# Patient Record
Sex: MALE | Race: ASIAN | NOT HISPANIC OR LATINO
[De-identification: names, ages, dates, MRNs, and addresses within clinical notes are randomized per-mention and may not be internally consistent; named-entity substitution may affect disease eponyms.]

---

## 2018-04-20 ENCOUNTER — APPOINTMENT (OUTPATIENT)
Dept: CARDIOLOGY | Facility: CLINIC | Age: 68
End: 2018-04-20
Payer: MEDICARE

## 2018-04-20 ENCOUNTER — NON-APPOINTMENT (OUTPATIENT)
Age: 68
End: 2018-04-20

## 2018-04-20 VITALS
WEIGHT: 176 LBS | HEIGHT: 66.14 IN | BODY MASS INDEX: 28.28 KG/M2 | RESPIRATION RATE: 17 BRPM | DIASTOLIC BLOOD PRESSURE: 64 MMHG | HEART RATE: 84 BPM | SYSTOLIC BLOOD PRESSURE: 99 MMHG | TEMPERATURE: 97.6 F | OXYGEN SATURATION: 96 %

## 2018-04-20 PROCEDURE — 93000 ELECTROCARDIOGRAM COMPLETE: CPT

## 2018-04-20 PROCEDURE — 99204 OFFICE O/P NEW MOD 45 MIN: CPT

## 2018-05-22 ENCOUNTER — NON-APPOINTMENT (OUTPATIENT)
Age: 68
End: 2018-05-22

## 2018-05-22 ENCOUNTER — APPOINTMENT (OUTPATIENT)
Dept: CARDIOLOGY | Facility: CLINIC | Age: 68
End: 2018-05-22
Payer: MEDICARE

## 2018-05-22 VITALS
TEMPERATURE: 98.2 F | SYSTOLIC BLOOD PRESSURE: 147 MMHG | OXYGEN SATURATION: 99 % | DIASTOLIC BLOOD PRESSURE: 97 MMHG | WEIGHT: 181 LBS | BODY MASS INDEX: 29.09 KG/M2 | HEART RATE: 89 BPM | RESPIRATION RATE: 18 BRPM

## 2018-05-22 PROCEDURE — 99214 OFFICE O/P EST MOD 30 MIN: CPT

## 2018-05-22 RX ORDER — APIXABAN 5 MG/1
5 TABLET, FILM COATED ORAL
Refills: 0 | Status: ACTIVE | COMMUNITY

## 2018-05-22 RX ORDER — ATENOLOL 25 MG/1
25 TABLET ORAL
Refills: 0 | Status: ACTIVE | COMMUNITY

## 2018-07-20 ENCOUNTER — APPOINTMENT (OUTPATIENT)
Dept: CARDIOLOGY | Facility: CLINIC | Age: 68
End: 2018-07-20
Payer: MEDICARE

## 2018-07-20 VITALS
RESPIRATION RATE: 17 BRPM | TEMPERATURE: 98.1 F | OXYGEN SATURATION: 96 % | WEIGHT: 178 LBS | DIASTOLIC BLOOD PRESSURE: 81 MMHG | BODY MASS INDEX: 28.61 KG/M2 | SYSTOLIC BLOOD PRESSURE: 120 MMHG | HEART RATE: 84 BPM

## 2018-07-20 PROCEDURE — 99214 OFFICE O/P EST MOD 30 MIN: CPT

## 2018-10-17 ENCOUNTER — APPOINTMENT (OUTPATIENT)
Dept: CARDIOLOGY | Facility: CLINIC | Age: 68
End: 2018-10-17
Payer: MEDICARE

## 2018-10-17 VITALS
TEMPERATURE: 97.4 F | RESPIRATION RATE: 18 BRPM | SYSTOLIC BLOOD PRESSURE: 114 MMHG | WEIGHT: 176 LBS | HEART RATE: 65 BPM | DIASTOLIC BLOOD PRESSURE: 77 MMHG | BODY MASS INDEX: 28.29 KG/M2 | OXYGEN SATURATION: 97 %

## 2018-10-17 PROCEDURE — 99214 OFFICE O/P EST MOD 30 MIN: CPT

## 2019-01-15 NOTE — PHYSICAL EXAM
[General Appearance - Well Developed] : well developed [Normal Appearance] : normal appearance [Well Groomed] : well groomed [General Appearance - Well Nourished] : well nourished [No Deformities] : no deformities [General Appearance - In No Acute Distress] : no acute distress [Normal Conjunctiva] : the conjunctiva exhibited no abnormalities [Eyelids - No Xanthelasma] : the eyelids demonstrated no xanthelasmas [Normal Oral Mucosa] : normal oral mucosa [No Oral Pallor] : no oral pallor [No Oral Cyanosis] : no oral cyanosis [Normal Jugular Venous A Waves Present] : normal jugular venous A waves present [Normal Jugular Venous V Waves Present] : normal jugular venous V waves present [No Jugular Venous Barnhart A Waves] : no jugular venous barnhart A waves [Respiration, Rhythm And Depth] : normal respiratory rhythm and effort [Exaggerated Use Of Accessory Muscles For Inspiration] : no accessory muscle use [Auscultation Breath Sounds / Voice Sounds] : lungs were clear to auscultation bilaterally [Heart Sounds] : normal S1 and S2 [Murmurs] : no murmurs present [Arterial Pulses Normal] : the arterial pulses were normal [Edema] : no peripheral edema present [Irregularly Irregular] : the rhythm was irregularly irregular [Abdomen Soft] : soft [Abdomen Tenderness] : non-tender [Abdomen Mass (___ Cm)] : no abdominal mass palpated [Abnormal Walk] : normal gait [Gait - Sufficient For Exercise Testing] : the gait was sufficient for exercise testing [Nail Clubbing] : no clubbing of the fingernails [Cyanosis, Localized] : no localized cyanosis [Petechial Hemorrhages (___cm)] : no petechial hemorrhages [] : no ischemic changes [Oriented To Time, Place, And Person] : oriented to person, place, and time [Affect] : the affect was normal [Mood] : the mood was normal [No Anxiety] : not feeling anxious

## 2019-01-16 ENCOUNTER — NON-APPOINTMENT (OUTPATIENT)
Age: 69
End: 2019-01-16

## 2019-01-16 ENCOUNTER — APPOINTMENT (OUTPATIENT)
Dept: CARDIOLOGY | Facility: CLINIC | Age: 69
End: 2019-01-16
Payer: MEDICARE

## 2019-01-16 VITALS
BODY MASS INDEX: 28.77 KG/M2 | RESPIRATION RATE: 16 BRPM | WEIGHT: 179 LBS | HEART RATE: 70 BPM | DIASTOLIC BLOOD PRESSURE: 89 MMHG | SYSTOLIC BLOOD PRESSURE: 128 MMHG | OXYGEN SATURATION: 97 %

## 2019-01-16 PROCEDURE — 93306 TTE W/DOPPLER COMPLETE: CPT

## 2019-01-16 PROCEDURE — 99214 OFFICE O/P EST MOD 30 MIN: CPT

## 2019-01-16 PROCEDURE — 93000 ELECTROCARDIOGRAM COMPLETE: CPT

## 2019-07-17 ENCOUNTER — APPOINTMENT (OUTPATIENT)
Dept: CARDIOLOGY | Facility: CLINIC | Age: 69
End: 2019-07-17
Payer: MEDICARE

## 2019-07-17 VITALS
TEMPERATURE: 97.8 F | OXYGEN SATURATION: 98 % | DIASTOLIC BLOOD PRESSURE: 82 MMHG | SYSTOLIC BLOOD PRESSURE: 130 MMHG | RESPIRATION RATE: 17 BRPM | HEART RATE: 75 BPM | BODY MASS INDEX: 29.89 KG/M2 | WEIGHT: 186 LBS

## 2019-07-17 PROCEDURE — 99214 OFFICE O/P EST MOD 30 MIN: CPT

## 2019-08-16 ENCOUNTER — APPOINTMENT (OUTPATIENT)
Dept: UROLOGY | Facility: CLINIC | Age: 69
End: 2019-08-16
Payer: MEDICARE

## 2019-08-16 VITALS
BODY MASS INDEX: 29.57 KG/M2 | RESPIRATION RATE: 18 BRPM | TEMPERATURE: 97.3 F | HEART RATE: 59 BPM | WEIGHT: 184 LBS | OXYGEN SATURATION: 97 % | DIASTOLIC BLOOD PRESSURE: 86 MMHG | SYSTOLIC BLOOD PRESSURE: 137 MMHG

## 2019-08-16 DIAGNOSIS — R53.1 WEAKNESS: ICD-10-CM

## 2019-08-16 DIAGNOSIS — Z00.00 ENCOUNTER FOR GENERAL ADULT MEDICAL EXAMINATION W/OUT ABNORMAL FINDINGS: ICD-10-CM

## 2019-08-16 DIAGNOSIS — R35.0 FREQUENCY OF MICTURITION: ICD-10-CM

## 2019-08-16 PROCEDURE — 99204 OFFICE O/P NEW MOD 45 MIN: CPT

## 2019-08-16 NOTE — PHYSICAL EXAM
[General Appearance - Well Developed] : well developed [General Appearance - Well Nourished] : well nourished [Normal Appearance] : normal appearance [Well Groomed] : well groomed [General Appearance - In No Acute Distress] : no acute distress [Edema] : no peripheral edema [Respiration, Rhythm And Depth] : normal respiratory rhythm and effort [Exaggerated Use Of Accessory Muscles For Inspiration] : no accessory muscle use [Abdomen Soft] : soft [Abdomen Tenderness] : non-tender [Costovertebral Angle Tenderness] : no ~M costovertebral angle tenderness [Urethral Meatus] : meatus normal [Urinary Bladder Findings] : the bladder was normal on palpation [Testes Mass (___cm)] : there were no testicular masses [Scrotum] : the scrotum was normal [No Prostate Nodules] : no prostate nodules [Normal Station and Gait] : the gait and station were normal for the patient's age [] : no rash [No Focal Deficits] : no focal deficits [Oriented To Time, Place, And Person] : oriented to person, place, and time [Affect] : the affect was normal [Mood] : the mood was normal [Not Anxious] : not anxious [No Palpable Adenopathy] : no palpable adenopathy

## 2019-08-16 NOTE — LETTER BODY
[FreeTextEntry1] : Miguel A Nicolas MD\par [address]\[place] [address]\par [phone]\par \par Dear Dr. Grady,\par \par Reason for Visit: BPH.\par \par This is a 69 year-old gentleman from [place] with history of stroke resulting in slight right hemiparesis, presenting with symptoms of BPH. Patient is here today for evaluation, accompanied by his wife. His wife contributes to his history because the patient has a slight speech deficit following stroke. His stoke was one year ago while at the gym and the reason for his stroke is unknown. Patient reports frequency and hesitancy. He complains of nocturia 3-4x per night. However, he reports strong uroflow. He denies any hematuria or urinary incontinence. His symptoms are aggravated by hydration. He denies any alleviating factors. He has not tried any medical therapy previously. He reports no pain. All other review of systems are negative. He has no cancer in his family medical history. He has no previous surgical history. Past medical history, family history and social history were inquired and were noncontributory to current condition. The patient does not use tobacco or drink alcohol. Medications and allergies were reviewed. He has no known allergies to medication. \par \par On examination, the patient is a healthy-appearing gentleman in no acute distress. He is alert and oriented follows commands. He has normal mood and affect. He is normocephalic. Neck is supple. Oral no thrush. Respirations are unlabored. His abdomen is soft and nontender. Bladder is nonpalpable. No CVA tenderness. Neurologically he is grossly intact. No peripheral edema. Skin without gross abnormality. He has normal male external genitalia. Normal meatus. Bilateral testes are descended intrascrotally and normal to palpation. On rectal examination, there is normal sphincter tone. The prostate is clinically benign without focal induration or nodularity.\par \par His BMP demonstrated normal renal functions, creatinine 0.95. He was found to have high levels of potassium, 5.8.\par \par ASSESSMENT: BPH. Right-sided weakness.\par \par I counseled the patient on the various etiology of his symptoms. I discussed the natural history of BPH and the treatment options available. I discussed the options of conservative management with fluid in dietary restrictions, herbal therapy, medical therapy, and minimally invasive procedures. Risk and benefits were discussed. I answered his questions. I recommended he try Flomax. I discussed the potential side effects of the medication. I counseled the patient on its use and side effects. If the patient develops any side effects, the patient will discontinue the medication and contact me. In terms of his right-sided weakness, I recommend the patient practice exercises at home, such as using a stress ball, to gradually build his strength. In terms of his high potassium levels, I advise the patient to decrease his potassium intake. He will obtain BMP, PSA, and urinalysis today to ensure stability. The patient will follow-up as directed and will contact me with any questions or concerns. Thank you for the opportunity to participate in the care of Mr. AN. I will keep you updated on his progress.\par \par Plan: Trial of Flomax. BMP. PSA. Urinalysis. Follow up in 1 month.

## 2019-08-16 NOTE — ADDENDUM
[FreeTextEntry1] : Entered by Eddie Mcbride, acting as scribe for Dr. Reuben Umana.\par \par The documentation recorded by the scribe accurately reflects the service I personally performed and the decisions made by me.

## 2019-08-21 LAB
ANION GAP SERPL CALC-SCNC: 14 MMOL/L
APPEARANCE: CLEAR
BACTERIA: NEGATIVE
BILIRUBIN URINE: NEGATIVE
BLOOD URINE: NEGATIVE
BUN SERPL-MCNC: 13 MG/DL
CALCIUM SERPL-MCNC: 9.6 MG/DL
CHLORIDE SERPL-SCNC: 99 MMOL/L
CO2 SERPL-SCNC: 25 MMOL/L
COLOR: NORMAL
CREAT SERPL-MCNC: 0.89 MG/DL
GLUCOSE QUALITATIVE U: NEGATIVE
GLUCOSE SERPL-MCNC: 98 MG/DL
HYALINE CASTS: 0 /LPF
KETONES URINE: NEGATIVE
LEUKOCYTE ESTERASE URINE: NEGATIVE
MICROSCOPIC-UA: NORMAL
NITRITE URINE: NEGATIVE
PH URINE: 6.5
POTASSIUM SERPL-SCNC: 5.6 MMOL/L
PROTEIN URINE: NEGATIVE
PSA FREE FLD-MCNC: 55 %
PSA FREE SERPL-MCNC: 0.31 NG/ML
PSA SERPL-MCNC: 0.57 NG/ML
RED BLOOD CELLS URINE: 0 /HPF
SODIUM SERPL-SCNC: 138 MMOL/L
SPECIFIC GRAVITY URINE: 1.01
SQUAMOUS EPITHELIAL CELLS: 0 /HPF
UROBILINOGEN URINE: NORMAL
WHITE BLOOD CELLS URINE: 0 /HPF

## 2019-09-20 ENCOUNTER — APPOINTMENT (OUTPATIENT)
Dept: UROLOGY | Facility: CLINIC | Age: 69
End: 2019-09-20
Payer: MEDICARE

## 2019-09-20 VITALS
RESPIRATION RATE: 18 BRPM | SYSTOLIC BLOOD PRESSURE: 137 MMHG | OXYGEN SATURATION: 99 % | DIASTOLIC BLOOD PRESSURE: 77 MMHG | HEART RATE: 69 BPM | BODY MASS INDEX: 29.25 KG/M2 | WEIGHT: 182 LBS | TEMPERATURE: 98.2 F

## 2019-09-20 PROCEDURE — 99213 OFFICE O/P EST LOW 20 MIN: CPT

## 2019-09-20 NOTE — ADDENDUM
[FreeTextEntry1] : Entered by Ruddy Doss, acting as scribe for Dr. Reuben Umana.\par \par The documentation recorded by the scribe accurately reflects the service I personally performed and the decisions made by me.

## 2019-09-20 NOTE — LETTER BODY
[FreeTextEntry1] : Miguel A Nicolas MD\par [address]\[place] [address]\par [phone]\par \par Dear Dr. Grady,\par \par Reason for Visit: BPH.\par \par This is a 69 year-old gentleman from [place] with history of stroke resulting in slight right hemiparesis, presenting with symptoms of BPH. He previously had a stroke while at the gym which caused speech deficit. Patient is here today for follow-up. Since his last visit, he reports taking Flomax as directed with no side effects or difficulties. He reports little improvement in urinary symptoms and notes that he hydrates very frequently. He denies any hematuria or urinary incontinence. He reports no pain. All other review of systems are negative. He has no cancer in his family medical history. He has no previous surgical history. Past medical history, family history and social history were unchanged. Medications and allergies were reviewed. He has no known allergies to medication. \par \par On examination, the patient is a healthy-appearing gentleman in no acute distress. He is alert and oriented follows commands. He has normal mood and affect. He is normocephalic. Neck is supple. Oral no thrush. Respirations are unlabored. His abdomen is soft and nontender. Bladder is nonpalpable. No CVA tenderness. Neurologically he is grossly intact. No peripheral edema. Skin without gross abnormality. He has normal male external genitalia. Normal meatus. Bilateral testes are descended intrascrotally and normal to palpation. On rectal examination, there is normal sphincter tone. The prostate is clinically benign without focal induration or nodularity.\par \par His recent PSA was 0.57, which is within normal limits. His BMP demonstrated normal renal functions, creatinine 0.69. He was found to have high levels of potassium, 5.6.\par \par ASSESSMENT: BPH. Right-sided weakness.\par \par I counseled the patient on the various etiology of his symptoms. In terms of his BPH, I recommend he continue taking Flomax once a day. I advised him to hydrate less frequently in order to not aggravate his symptoms. The patient will follow-up as directed and will contact me with any questions or concerns. Thank you for the opportunity to participate in the care of Mr. AN. I will keep you updated on his progress.\par \par Plan: Continue Flomax. Follow up in 6 months.

## 2020-01-15 ENCOUNTER — APPOINTMENT (OUTPATIENT)
Dept: CARDIOLOGY | Facility: CLINIC | Age: 70
End: 2020-01-15
Payer: MEDICARE

## 2020-01-15 ENCOUNTER — NON-APPOINTMENT (OUTPATIENT)
Age: 70
End: 2020-01-15

## 2020-01-15 VITALS
DIASTOLIC BLOOD PRESSURE: 88 MMHG | BODY MASS INDEX: 29.41 KG/M2 | SYSTOLIC BLOOD PRESSURE: 131 MMHG | OXYGEN SATURATION: 96 % | HEART RATE: 74 BPM | RESPIRATION RATE: 18 BRPM | TEMPERATURE: 97.4 F | WEIGHT: 183 LBS

## 2020-01-15 PROCEDURE — 93000 ELECTROCARDIOGRAM COMPLETE: CPT

## 2020-01-15 PROCEDURE — 93306 TTE W/DOPPLER COMPLETE: CPT

## 2020-01-15 PROCEDURE — 99214 OFFICE O/P EST MOD 30 MIN: CPT

## 2020-07-16 NOTE — DISCUSSION/SUMMARY
[FreeTextEntry1] : 68-year-old male with AFIB, stroke, HTN presents for followup.  Patient was last seen on 10/17/18.  He is on Eliquis 5 mg BID for stroke prevention and  Atenolol 25 mg for rate control.  He is on Atorvastatin 40 mg for HLD.  He is on Losartan 50 mg for HTN.\par \par Patient reports feeling well. He is planning to return to  soon. His PCP recently cut his Losartan to 25 mg QD due to lowered BP.  Patient denies CP, SOB, palpitations, or lightheadedness. \par \par (1) AFIB, stroke -  Patient has been stable.  His HR is well-controlled.  He should continue Atenolol 25 mg.  His [id number] score is 4 (HTN, age 65-75, prior stroke).  He should continue Eliquis 5 mg BID for stroke prevention.  Patient underwent an echocardiogram and it showed normal LV function, moderate LAE (4.6 cm), without significant valvular pathology.  Patient was reassured. \par \par (2) HTN - His BP was good today.  He should continue Losartan 25 mg and Atenolol 25 mg.  \par \par (3) Followup - 6 months.

## 2020-07-16 NOTE — DISCUSSION/SUMMARY
[FreeTextEntry1] : 69-year-old male with AFIB, stroke, HTN presents for followup.  Patient was last seen on 1/16/19.   Patient underwent an echocardiogram and it showed normal LV function, moderate LAE (4.6 cm), without significant valvular pathology.   He is on Eliquis 5 mg BID for stroke prevention and  Atenolol 25 mg for rate control.  He is on Atorvastatin 20 mg for HLD.  He is on Amlodipine 2.5 mg for HTN.\par \par Patient reports feeling well.  He swims regularly.  Patient denies CP, SOB, palpitations, or lightheadedness.\par \par (1) AFIB, stroke -  Patient has been stable.  His HR is well-controlled.  He should continue Atenolol 25 mg.  His [id number] score is 4 (HTN, age 65-75, prior stroke).  He should continue Eliquis 5 mg BID for stroke prevention.  Patient underwent an echocardiogram on 1/16/19 and it showed normal LV function, moderate LAE (4.6 cm), without significant valvular pathology.  \par \par (2) HTN - His BP was good today.  He should continue Amlodipine 2.5 mg and Atenolol 25 mg.  \par \par (3) Followup - 6 months.

## 2020-07-16 NOTE — REASON FOR VISIT
[Follow-Up - Clinic] : a clinic follow-up of [Atrial Fibrillation] : atrial fibrillation [FreeTextEntry1] : stroke

## 2020-07-16 NOTE — HISTORY OF PRESENT ILLNESS
[FreeTextEntry1] : 69-year-old male with AFIB, stroke, HTN presents for followup.  Patient was last seen on 7/17/19.   He is on Eliquis 5 mg BID for stroke prevention and  Atenolol 25 mg for rate control.  He is on Atorvastatin 20 mg for HLD.  He is on Amlodipine 2.5 mg for HTN.\par \par Patient has been stable.  Patient denies CP, SOB, palpitations, or lightheadedness. He reports that he is able to run. Patient is requesting to change blood thinner due to high copay on Eliquis. I advised patient to check if Xarelto is cheaper.

## 2020-07-16 NOTE — DISCUSSION/SUMMARY
[FreeTextEntry1] : 69-year-old male with AFIB, stroke, HTN presents for followup.  Patient was last seen on 7/17/19.   He is on Eliquis 5 mg BID for stroke prevention and  Atenolol 25 mg for rate control.  He is on Atorvastatin 20 mg for HLD.  He is on Amlodipine 2.5 mg for HTN.\par \par Patient has been stable.  Patient denies CP, SOB, palpitations, or lightheadedness. He reports that he is able to run. Patient is requesting to change blood thinner due to high copay on Eliquis. I advised patient to check if Xarelto is cheaper. \par \par (1) AFIB, stroke -  Patient has been stable.  His HR is well-controlled.  He should continue Atenolol 25 mg.  His [id number] score is 4 (HTN, age 65-75, prior stroke).  He should continue Eliquis 5 mg BID for stroke prevention.  Patient underwent an echocardiogram  and it showed normal LV function, moderate LAE (4.7 cm), without significant valvular pathology.  \par \par (2) HTN - His BP was good today.  He should continue Amlodipine 2.5 mg and Atenolol 25 mg.  \par \par (3) Aortic aneurysm - Patient was noted to have moderate dilatation of the ascending aorta (4.6 cm).  I advised patient to keep BP under good control.  Patient should have a followup echo in 1 year. \par \par (4) Followup - 6 months.

## 2020-07-16 NOTE — HISTORY OF PRESENT ILLNESS
[FreeTextEntry1] : 68-year-old male with AFIB, stroke, HTN presents for followup.  Patient was last seen on 10/17/18.  He is on Eliquis 5 mg BID for stroke prevention and  Atenolol 25 mg for rate control.  He is on Atorvastatin 40 mg for HLD.  He is on Losartan 50 mg for HTN.\par \par Patient reports feeling well. He is planning to return to  soon. His PCP recently cut his Losartan to 25 mg QD due to lowered BP.  Patient denies CP, SOB, palpitations, or lightheadedness. \par

## 2020-07-16 NOTE — HISTORY OF PRESENT ILLNESS
[FreeTextEntry1] : 69-year-old male with AFIB, stroke, HTN presents for followup.  Patient was last seen on 1/16/19.   Patient underwent an echocardiogram and it showed normal LV function, moderate LAE (4.6 cm), without significant valvular pathology.   He is on Eliquis 5 mg BID for stroke prevention and  Atenolol 25 mg for rate control.  He is on Atorvastatin 20 mg for HLD.  He is on Amlodipine 2.5 mg for HTN.\par \par Patient reports feeling well.  He swims regularly.  Patient denies CP, SOB, palpitations, or lightheadedness.

## 2020-07-16 NOTE — PHYSICAL EXAM
[General Appearance - Well Developed] : well developed [Well Groomed] : well groomed [Normal Appearance] : normal appearance [General Appearance - In No Acute Distress] : no acute distress [General Appearance - Well Nourished] : well nourished [No Deformities] : no deformities [Normal Conjunctiva] : the conjunctiva exhibited no abnormalities [Eyelids - No Xanthelasma] : the eyelids demonstrated no xanthelasmas [Normal Oral Mucosa] : normal oral mucosa [No Oral Pallor] : no oral pallor [No Oral Cyanosis] : no oral cyanosis [Normal Jugular Venous A Waves Present] : normal jugular venous A waves present [Normal Jugular Venous V Waves Present] : normal jugular venous V waves present [No Jugular Venous Barnhart A Waves] : no jugular venous barnhart A waves [Respiration, Rhythm And Depth] : normal respiratory rhythm and effort [Exaggerated Use Of Accessory Muscles For Inspiration] : no accessory muscle use [Heart Sounds] : normal S1 and S2 [Auscultation Breath Sounds / Voice Sounds] : lungs were clear to auscultation bilaterally [Arterial Pulses Normal] : the arterial pulses were normal [Murmurs] : no murmurs present [Irregularly Irregular] : the rhythm was irregularly irregular [Edema] : no peripheral edema present [Abdomen Soft] : soft [Abdomen Tenderness] : non-tender [Abdomen Mass (___ Cm)] : no abdominal mass palpated [Abnormal Walk] : normal gait [Nail Clubbing] : no clubbing of the fingernails [Gait - Sufficient For Exercise Testing] : the gait was sufficient for exercise testing [Petechial Hemorrhages (___cm)] : no petechial hemorrhages [Cyanosis, Localized] : no localized cyanosis [] : no ischemic changes [Affect] : the affect was normal [Oriented To Time, Place, And Person] : oriented to person, place, and time [Mood] : the mood was normal [No Anxiety] : not feeling anxious

## 2020-07-17 ENCOUNTER — APPOINTMENT (OUTPATIENT)
Dept: CARDIOLOGY | Facility: CLINIC | Age: 70
End: 2020-07-17
Payer: MEDICARE

## 2020-07-17 VITALS
DIASTOLIC BLOOD PRESSURE: 83 MMHG | OXYGEN SATURATION: 96 % | WEIGHT: 164 LBS | HEART RATE: 82 BPM | SYSTOLIC BLOOD PRESSURE: 121 MMHG | RESPIRATION RATE: 18 BRPM | TEMPERATURE: 97.6 F | BODY MASS INDEX: 26.36 KG/M2

## 2020-07-17 DIAGNOSIS — I63.9 CEREBRAL INFARCTION, UNSPECIFIED: ICD-10-CM

## 2020-07-17 PROCEDURE — 99214 OFFICE O/P EST MOD 30 MIN: CPT

## 2020-07-17 RX ORDER — LOSARTAN POTASSIUM 25 MG/1
25 TABLET, FILM COATED ORAL DAILY
Qty: 30 | Refills: 5 | Status: DISCONTINUED | COMMUNITY
Start: 2018-01-05 | End: 2020-07-17

## 2020-07-31 PROBLEM — I63.9 STROKE: Status: ACTIVE | Noted: 2018-04-28

## 2020-08-06 NOTE — DISCUSSION/SUMMARY
[FreeTextEntry1] : 69-year-old male with AFIB, stroke, HTN presents for followup.  Patient was last seen on 7/17/19.   He is on Eliquis 5 mg BID for stroke prevention and  Atenolol 25 mg for rate control.  He is on Atorvastatin 20 mg for HLD.  He is on Amlodipine 2.5 mg for HTN.\par \par Patient has been stable.  Patient denies CP, SOB, palpitations, or lightheadedness. He reports that he is able to run. Patient is requesting to change blood thinner due to high copay on Eliquis. I advised patient to check if Xarelto is cheaper. \par \par (1) AFIB, stroke -  Patient has been stable.  His HR is well-controlled.  He should continue Atenolol 25 mg.  His [id number] score is 4 (HTN, age 65-75, prior stroke).  He should continue Eliquis 5 mg BID for stroke prevention.  Patient underwent an echocardiogram  and it showed normal LV function, moderate LAE (4.7 cm), without significant valvular pathology.  \par \par (2) HTN - His BP was good today.  He should continue Amlodipine 2.5 mg and Atenolol 25 mg.  \par \par (3) Aortic aneurysm - Patient was noted to have moderate dilatation of the ascending aorta (4.6 cm).  I advised patient to keep BP under good control.  Patient should have a followup echo in 1 year. \par \par (4) Followup - 6 months.

## 2020-08-06 NOTE — HISTORY OF PRESENT ILLNESS
[FreeTextEntry1] : 69-year-old male with AFIB, stroke, HTN presents for followup.  Patient was last seen on 1/15/20.   Patient underwent an echocardiogram  and it showed normal LV function, moderate LAE (4.7 cm), without significant valvular pathology.  Patient was noted to have moderate dilatation of the ascending aorta (4.6 cm).  He is on Eliquis 5 mg BID for stroke prevention and  Atenolol 25 mg for rate control.  He is on Atorvastatin 20 mg for HLD.  He is on Amlodipine 2.5 mg for HTN.\par \par ------------------------------------------------------------------------------------------------------------- \par previous visit summary: \par \par (1) AFIB, stroke -  Patient has been stable.  His HR is well-controlled.  He should continue Atenolol 25 mg.  His [id number] score is 4 (HTN, age 65-75, prior stroke).  He should continue Eliquis 5 mg BID for stroke prevention.  Patient underwent an echocardiogram  and it showed normal LV function, moderate LAE (4.7 cm), without significant valvular pathology.  \par \par (2) HTN - His BP was good today.  He should continue Amlodipine 2.5 mg and Atenolol 25 mg.  \par \par (3) Aortic aneurysm - Patient was noted to have moderate dilatation of the ascending aorta (4.6 cm).  I advised patient to keep BP under good control.  Patient should have a followup echo in 1 year. \par \par (4) Followup - 6 months.\par \par OLD NOTE -\par \par 7/17/19.   He is on Eliquis 5 mg BID for stroke prevention and  Atenolol 25 mg for rate control.  He is on Atorvastatin 20 mg for HLD.  He is on Amlodipine 2.5 mg for HTN.\par \par Patient has been stable.  Patient denies CP, SOB, palpitations, or lightheadedness. He reports that he is able to run. Patient is requesting to change blood thinner due to high copay on Eliquis. I advised patient to check if Xarelto is cheaper.

## 2020-08-06 NOTE — REASON FOR VISIT
[Follow-Up - Clinic] : a clinic follow-up of [Atrial Fibrillation] : atrial fibrillation [FreeTextEntry1] : Patient reports feeling okay. Patient denies CP, SOB, palpitations, or lightheadedness. He is on Eliquis, his BP is low but that is good for AA. He walks and his appetite is okay.

## 2021-01-15 ENCOUNTER — APPOINTMENT (OUTPATIENT)
Dept: CARDIOLOGY | Facility: CLINIC | Age: 71
End: 2021-01-15
Payer: MEDICARE

## 2021-01-15 ENCOUNTER — NON-APPOINTMENT (OUTPATIENT)
Age: 71
End: 2021-01-15

## 2021-01-15 VITALS
RESPIRATION RATE: 18 BRPM | DIASTOLIC BLOOD PRESSURE: 74 MMHG | WEIGHT: 166 LBS | HEART RATE: 65 BPM | OXYGEN SATURATION: 100 % | SYSTOLIC BLOOD PRESSURE: 124 MMHG | TEMPERATURE: 97.2 F | BODY MASS INDEX: 26.68 KG/M2

## 2021-01-15 PROCEDURE — 93306 TTE W/DOPPLER COMPLETE: CPT

## 2021-01-15 PROCEDURE — 99214 OFFICE O/P EST MOD 30 MIN: CPT

## 2021-01-15 PROCEDURE — 99072 ADDL SUPL MATRL&STAF TM PHE: CPT

## 2021-01-15 PROCEDURE — 93000 ELECTROCARDIOGRAM COMPLETE: CPT

## 2021-01-15 NOTE — PHYSICAL EXAM
[General Appearance - Well Developed] : well developed [Normal Appearance] : normal appearance [Well Groomed] : well groomed [General Appearance - Well Nourished] : well nourished [No Deformities] : no deformities [General Appearance - In No Acute Distress] : no acute distress [Normal Conjunctiva] : the conjunctiva exhibited no abnormalities [Eyelids - No Xanthelasma] : the eyelids demonstrated no xanthelasmas [Normal Oral Mucosa] : normal oral mucosa [No Oral Pallor] : no oral pallor [No Oral Cyanosis] : no oral cyanosis [Normal Jugular Venous A Waves Present] : normal jugular venous A waves present [Normal Jugular Venous V Waves Present] : normal jugular venous V waves present [No Jugular Venous Barnhart A Waves] : no jugular venous barnhart A waves [Respiration, Rhythm And Depth] : normal respiratory rhythm and effort [Exaggerated Use Of Accessory Muscles For Inspiration] : no accessory muscle use [Auscultation Breath Sounds / Voice Sounds] : lungs were clear to auscultation bilaterally [Heart Sounds] : normal S1 and S2 [Murmurs] : no murmurs present [Arterial Pulses Normal] : the arterial pulses were normal [Edema] : no peripheral edema present [Irregularly Irregular] : the rhythm was irregularly irregular [Abdomen Soft] : soft [Abdomen Tenderness] : non-tender [Abdomen Mass (___ Cm)] : no abdominal mass palpated [Nail Clubbing] : no clubbing of the fingernails [Cyanosis, Localized] : no localized cyanosis [Petechial Hemorrhages (___cm)] : no petechial hemorrhages [] : no ischemic changes [Oriented To Time, Place, And Person] : oriented to person, place, and time [Affect] : the affect was normal [Mood] : the mood was normal [No Anxiety] : not feeling anxious [FreeTextEntry1] : limited mobility.

## 2021-01-15 NOTE — DISCUSSION/SUMMARY
[FreeTextEntry1] : 69-year-old male with AFIB, stroke, aortic aneurysm,  HTN presents for followup.  \par \par Patient was last seen on 7/17/20.   He is on Eliquis 5 mg BID for stroke prevention and  Atenolol 25 mg for rate control.  He is on Atorvastatin 10 mg for HLD.  He is no longer on Amlodipine 2.5 mg for HTN.\par \par Last echo was on 1/15/20 and it showed normal LV function, moderate LAE (4.7 cm), without significant valvular pathology.  Patient was noted to have moderate dilatation of the ascending aorta (4.6 cm). \par \par 1/15/21 - Patient has been stable.   Patient denies CP, SOB, palpitations, or lightheadedness.  He is no longer taking Amlodipine 2.5 mg because his BP has been good.  He is only taking Atorvastatin 10 mg now and his LDL is quite low.  Wife reports that he is eating fatty food because he thinks it is good for him.  \par \par OLD NOTE -\par \par (1) AFIB, stroke -  Patient has been stable.  His HR is well-controlled.  He should continue Atenolol 25 mg.  His [id number] score is 4 (HTN, age 65-75, prior stroke).  He should continue Eliquis 5 mg BID for stroke prevention.  Patient underwent an echocardiogram  and it showed normal LV function, moderate LAE (4.7 cm), without significant valvular pathology.  \par \par (2) HTN - His BP was good today.  He should continue Amlodipine 2.5 mg and Atenolol 25 mg.  \par \par (3) Aortic aneurysm - Patient was noted to have moderate dilatation of the ascending aorta (4.6 cm).  I advised patient to keep BP under good control.  Patient should have a followup echo in 1 year. \par \par (4) Followup - 6 months.

## 2021-01-15 NOTE — HISTORY OF PRESENT ILLNESS
[FreeTextEntry1] : 69-year-old male with AFIB, stroke, aortic aneurysm,  HTN presents for followup.  \par \par Patient was last seen on 7/17/20.   He is on Eliquis 5 mg BID for stroke prevention and  Atenolol 25 mg for rate control.  He is on Atorvastatin 10 mg for HLD.  He is no longer on Amlodipine 2.5 mg for HTN.\par \par Last echo was on 1/15/20 and it showed normal LV function, moderate LAE (4.7 cm), without significant valvular pathology.  Patient was noted to have moderate dilatation of the ascending aorta (4.6 cm). \par \par 1/15/21 - Patient has been stable.   Patient denies CP, SOB, palpitations, or lightheadedness.  He is no longer taking Amlodipine 2.5 mg because his BP has been good.  He is only taking Atorvastatin 10 mg now and his LDL is quite low.  Wife reports that he is eating fatty food because he thinks it is good for him.

## 2021-01-15 NOTE — REASON FOR VISIT
[Follow-Up - Clinic] : a clinic follow-up of [Atrial Fibrillation] : atrial fibrillation [FreeTextEntry1] : 1/15/21 - Patient has been stable.   Patient denies CP, SOB, palpitations, or lightheadedness.  He is no longer taking Amlodipine 2.5 mg because his BP has been good.  He is only taking Atorvastatin 10 mg now and his LDL is quite low.  Wife reports that he is eating fatty food because he thinks it is good for him.  \par \par \par 7/17/20 - Patient reports feeling okay. Patient denies CP, SOB, palpitations, or lightheadedness. He is on Eliquis, his BP is low but that is good for aortic aneurysm.. He walks and his appetite is okay.

## 2021-07-14 ENCOUNTER — NON-APPOINTMENT (OUTPATIENT)
Age: 71
End: 2021-07-14

## 2021-07-14 ENCOUNTER — APPOINTMENT (OUTPATIENT)
Dept: CARDIOLOGY | Facility: CLINIC | Age: 71
End: 2021-07-14
Payer: MEDICARE

## 2021-07-14 VITALS
DIASTOLIC BLOOD PRESSURE: 73 MMHG | BODY MASS INDEX: 25.55 KG/M2 | RESPIRATION RATE: 18 BRPM | WEIGHT: 159 LBS | TEMPERATURE: 97.7 F | HEART RATE: 68 BPM | SYSTOLIC BLOOD PRESSURE: 105 MMHG | OXYGEN SATURATION: 98 %

## 2021-07-14 PROCEDURE — 93000 ELECTROCARDIOGRAM COMPLETE: CPT

## 2021-07-14 PROCEDURE — 99214 OFFICE O/P EST MOD 30 MIN: CPT

## 2021-07-14 PROCEDURE — 99072 ADDL SUPL MATRL&STAF TM PHE: CPT

## 2021-07-14 RX ORDER — AMLODIPINE BESYLATE 2.5 MG/1
2.5 TABLET ORAL
Qty: 30 | Refills: 0 | Status: DISCONTINUED | COMMUNITY
Start: 2019-05-13 | End: 2021-07-14

## 2021-07-14 RX ORDER — ATORVASTATIN CALCIUM 80 MG/1
80 TABLET, FILM COATED ORAL
Qty: 90 | Refills: 0 | Status: DISCONTINUED | COMMUNITY
Start: 2018-02-02 | End: 2021-07-14

## 2022-01-01 ENCOUNTER — APPOINTMENT (OUTPATIENT)
Dept: CARDIOLOGY | Facility: CLINIC | Age: 72
End: 2022-01-01
Payer: MEDICARE

## 2022-01-01 ENCOUNTER — APPOINTMENT (OUTPATIENT)
Dept: CARDIOLOGY | Facility: CLINIC | Age: 72
End: 2022-01-01

## 2022-01-01 ENCOUNTER — NON-APPOINTMENT (OUTPATIENT)
Age: 72
End: 2022-01-01

## 2022-01-01 ENCOUNTER — APPOINTMENT (OUTPATIENT)
Dept: UROLOGY | Facility: CLINIC | Age: 72
End: 2022-01-01

## 2022-01-01 VITALS
WEIGHT: 160 LBS | BODY MASS INDEX: 25.71 KG/M2 | DIASTOLIC BLOOD PRESSURE: 76 MMHG | RESPIRATION RATE: 18 BRPM | TEMPERATURE: 97.8 F | OXYGEN SATURATION: 97 % | SYSTOLIC BLOOD PRESSURE: 112 MMHG | HEART RATE: 72 BPM

## 2022-01-01 VITALS
OXYGEN SATURATION: 97 % | SYSTOLIC BLOOD PRESSURE: 132 MMHG | DIASTOLIC BLOOD PRESSURE: 81 MMHG | HEART RATE: 89 BPM | RESPIRATION RATE: 18 BRPM | WEIGHT: 156 LBS | BODY MASS INDEX: 25.07 KG/M2 | TEMPERATURE: 98.1 F

## 2022-01-01 VITALS
SYSTOLIC BLOOD PRESSURE: 109 MMHG | TEMPERATURE: 98 F | BODY MASS INDEX: 24.75 KG/M2 | RESPIRATION RATE: 18 BRPM | OXYGEN SATURATION: 98 % | HEIGHT: 66.14 IN | DIASTOLIC BLOOD PRESSURE: 75 MMHG | WEIGHT: 154 LBS | HEART RATE: 87 BPM

## 2022-01-01 DIAGNOSIS — R35.1 NOCTURIA: ICD-10-CM

## 2022-01-01 DIAGNOSIS — R07.89 OTHER CHEST PAIN: ICD-10-CM

## 2022-01-01 DIAGNOSIS — N40.1 BENIGN PROSTATIC HYPERPLASIA WITH LOWER URINARY TRACT SYMPMS: ICD-10-CM

## 2022-01-01 DIAGNOSIS — R35.0 FREQUENCY OF MICTURITION: ICD-10-CM

## 2022-01-01 DIAGNOSIS — Z78.9 OTHER SPECIFIED HEALTH STATUS: ICD-10-CM

## 2022-01-01 DIAGNOSIS — I69.328 OTHER SPEECH AND LANGUAGE DEFICITS FOLLOWING CEREBRAL INFARCTION: ICD-10-CM

## 2022-01-01 DIAGNOSIS — I10 ESSENTIAL (PRIMARY) HYPERTENSION: ICD-10-CM

## 2022-01-01 DIAGNOSIS — J90 PLEURAL EFFUSION, NOT ELSEWHERE CLASSIFIED: ICD-10-CM

## 2022-01-01 LAB
ANION GAP SERPL CALC-SCNC: 13 MMOL/L
APPEARANCE: CLEAR
BACTERIA UR CULT: NORMAL
BACTERIA: NEGATIVE
BILIRUBIN URINE: NEGATIVE
BLOOD URINE: NEGATIVE
BUN SERPL-MCNC: 15 MG/DL
CALCIUM SERPL-MCNC: 9.3 MG/DL
CHLORIDE SERPL-SCNC: 97 MMOL/L
CO2 SERPL-SCNC: 23 MMOL/L
COLOR: YELLOW
CREAT SERPL-MCNC: 0.77 MG/DL
EGFR: 95 ML/MIN/1.73M2
GLUCOSE QUALITATIVE U: NEGATIVE
GLUCOSE SERPL-MCNC: 100 MG/DL
HYALINE CASTS: 0 /LPF
KETONES URINE: NEGATIVE
LEUKOCYTE ESTERASE URINE: NEGATIVE
MICROSCOPIC-UA: NORMAL
NITRITE URINE: NEGATIVE
PH URINE: 6.5
POTASSIUM SERPL-SCNC: 5.5 MMOL/L
PROTEIN URINE: NORMAL
PSA FREE FLD-MCNC: 38 %
PSA FREE SERPL-MCNC: 0.32 NG/ML
PSA SERPL-MCNC: 0.84 NG/ML
RED BLOOD CELLS URINE: 1 /HPF
SODIUM SERPL-SCNC: 132 MMOL/L
SPECIFIC GRAVITY URINE: 1.02
SQUAMOUS EPITHELIAL CELLS: 0 /HPF
UROBILINOGEN URINE: NORMAL
WHITE BLOOD CELLS URINE: 1 /HPF

## 2022-01-01 PROCEDURE — 99214 OFFICE O/P EST MOD 30 MIN: CPT

## 2022-01-01 PROCEDURE — 99214 OFFICE O/P EST MOD 30 MIN: CPT | Mod: 25

## 2022-01-01 PROCEDURE — 51798 US URINE CAPACITY MEASURE: CPT

## 2022-01-01 PROCEDURE — 93306 TTE W/DOPPLER COMPLETE: CPT

## 2022-01-01 PROCEDURE — 93000 ELECTROCARDIOGRAM COMPLETE: CPT

## 2022-01-01 PROCEDURE — 99204 OFFICE O/P NEW MOD 45 MIN: CPT | Mod: 25

## 2022-01-01 RX ORDER — SIMVASTATIN 10 MG/1
10 TABLET, FILM COATED ORAL
Qty: 30 | Refills: 5 | Status: ACTIVE | COMMUNITY
Start: 2021-07-14 | End: 1900-01-01

## 2022-01-01 RX ORDER — DICLOFENAC SODIUM 1% 10 MG/G
1 GEL TOPICAL
Qty: 1 | Refills: 1 | Status: ACTIVE | COMMUNITY
Start: 2022-01-01 | End: 1900-01-01

## 2022-01-01 RX ORDER — TROSPIUM CHLORIDE 20 MG/1
20 TABLET, FILM COATED ORAL
Qty: 30 | Refills: 3 | Status: ACTIVE | COMMUNITY
Start: 2022-01-01 | End: 1900-01-01

## 2022-01-01 RX ORDER — TAMSULOSIN HYDROCHLORIDE 0.4 MG/1
0.4 CAPSULE ORAL
Qty: 90 | Refills: 3 | Status: ACTIVE | COMMUNITY
Start: 2019-08-16 | End: 1900-01-01

## 2022-01-10 NOTE — PHYSICAL EXAM
[General Appearance - Well Developed] : well developed [Normal Appearance] : normal appearance [Well Groomed] : well groomed [General Appearance - Well Nourished] : well nourished [No Deformities] : no deformities [General Appearance - In No Acute Distress] : no acute distress [Normal Conjunctiva] : the conjunctiva exhibited no abnormalities [Eyelids - No Xanthelasma] : the eyelids demonstrated no xanthelasmas [Normal Oral Mucosa] : normal oral mucosa [No Oral Pallor] : no oral pallor [No Oral Cyanosis] : no oral cyanosis [Normal Jugular Venous A Waves Present] : normal jugular venous A waves present [Normal Jugular Venous V Waves Present] : normal jugular venous V waves present [No Jugular Venous Barnhart A Waves] : no jugular venous barnhart A waves [Respiration, Rhythm And Depth] : normal respiratory rhythm and effort [Exaggerated Use Of Accessory Muscles For Inspiration] : no accessory muscle use [Auscultation Breath Sounds / Voice Sounds] : lungs were clear to auscultation bilaterally [Heart Sounds] : normal S1 and S2 [Murmurs] : no murmurs present [Arterial Pulses Normal] : the arterial pulses were normal [Edema] : no peripheral edema present [Irregularly Irregular] : the rhythm was irregularly irregular [Abdomen Soft] : soft [Abdomen Tenderness] : non-tender [Abdomen Mass (___ Cm)] : no abdominal mass palpated [FreeTextEntry1] : limited mobility.  [Nail Clubbing] : no clubbing of the fingernails [Cyanosis, Localized] : no localized cyanosis [Petechial Hemorrhages (___cm)] : no petechial hemorrhages [] : no ischemic changes [Oriented To Time, Place, And Person] : oriented to person, place, and time [Affect] : the affect was normal [Mood] : the mood was normal [No Anxiety] : not feeling anxious

## 2022-01-10 NOTE — REASON FOR VISIT
[Symptom and Test Evaluation] : symptom and test evaluation [FreeTextEntry1] : 71-year-old male with AFIB, stroke, aortic aneurysm,  HTN presents for followup.  \par \par Patient was last seen on 7/14/21.\par \par He is on Eliquis 5 mg BID for stroke prevention and  Atenolol 25 mg for rate control.  \par \par Last echo was on 1/15/21 and it showed normal LV function, marked LAE (5.3 cm), without significant valvular pathology.  Ascending aorta was not well-visualized.\par \par

## 2022-01-10 NOTE — HISTORY OF PRESENT ILLNESS
[FreeTextEntry1] : 7/14/21 - Patient has been stable.  Patient denies CP, SOB, palpitations, or lightheadedness.  His ECG showed AFIB at rate of 60.  He has trace LE edema. \par \par 1/15/21 - Patient has been stable.   Patient denies CP, SOB, palpitations, or lightheadedness.  He is no longer taking Amlodipine 2.5 mg because his BP has been good.  He is only taking Atorvastatin 10 mg now and his LDL is quite low.  Wife reports that he is eating fatty food because he thinks it is good for him.  \par \par 7/17/20 - Patient reports feeling okay. Patient denies CP, SOB, palpitations, or lightheadedness. He is on Eliquis, his BP is low but that is good for aortic aneurysm.. He walks and his appetite is okay.

## 2022-01-10 NOTE — HISTORY OF PRESENT ILLNESS
[FreeTextEntry1] : 69-year-old male with AFIB, stroke, aortic aneurysm,  HTN presents for followup.  \par \par Patient was last seen on 1/15/21.\par \par He is on Eliquis 5 mg BID for stroke prevention and  Atenolol 25 mg for rate control.  He is on Atorvastatin 10 mg for HLD.  He is no longer on Amlodipine 2.5 mg for HTN.\par \par Last echo was on 1/15/21 and it showed normal LV function, marked LAE (5.3 cm), without significant valvular pathology.  Ascending aorta was not well-visualized.\par \par

## 2022-01-10 NOTE — REASON FOR VISIT
[Symptom and Test Evaluation] : symptom and test evaluation [FreeTextEntry1] : 7/14/21 - Patient has been stable. Patient denies CP, SOB, palpitations, or lightheadedness. His ECG showed AFIB at rate of 60. He has trace LE edema. \par \par 1/15/21 - Patient has been stable.   Patient denies CP, SOB, palpitations, or lightheadedness.  He is no longer taking Amlodipine 2.5 mg because his BP has been good.  He is only taking Atorvastatin 10 mg now and his LDL is quite low.  Wife reports that he is eating fatty food because he thinks it is good for him.  \par \par 7/17/20 - Patient reports feeling okay. Patient denies CP, SOB, palpitations, or lightheadedness. He is on Eliquis, his BP is low but that is good for aortic aneurysm.. He walks and his appetite is okay.

## 2022-07-14 NOTE — REASON FOR VISIT
[FreeTextEntry1] : 72-year-old male with AFIB, stroke, aortic aneurysm,  HTN presents for followup.  \par \par Patient was last seen on 7/14/21.\par \par He is on Eliquis 5 mg BID for stroke prevention and  Atenolol 25 mg for rate control.  He is on Simvastatin 10 mg for HLD.  \par \par Last echo was on 1/15/21 and it showed normal LV function, marked LAE (5.3 cm), without significant valvular pathology.  Ascending aorta was not well-visualized.\par \par

## 2022-07-14 NOTE — HISTORY OF PRESENT ILLNESS
[FreeTextEntry1] : \par 1/14/22 - Patient has been stable.  Patient denies CP, SOB, or palpitations.  I advised patient to undergo an echocardiogram.  FU 6 months. \par \par 7/14/21 - Patient has been stable.  Patient denies CP, SOB, palpitations, or lightheadedness.  His ECG showed AFIB at rate of 60.  He has trace LE edema. \par \par 1/15/21 - Patient has been stable.   Patient denies CP, SOB, palpitations, or lightheadedness.  He is no longer taking Amlodipine 2.5 mg because his BP has been good.  He is only taking Atorvastatin 10 mg now and his LDL is quite low.  Wife reports that he is eating fatty food because he thinks it is good for him.  \par \par 7/17/20 - Patient reports feeling okay. Patient denies CP, SOB, palpitations, or lightheadedness. He is on Eliquis, his BP is low but that is good for aortic aneurysm.. He walks and his appetite is okay.

## 2022-07-15 PROBLEM — R07.89 CHEST DISCOMFORT: Status: ACTIVE | Noted: 2022-01-01

## 2022-12-23 PROBLEM — Z78.9 NEVER SMOKED TOBACCO: Status: ACTIVE | Noted: 2022-01-01

## 2022-12-23 PROBLEM — R35.0 FREQUENCY OF URINATION: Status: ACTIVE | Noted: 2022-01-01

## 2022-12-23 PROBLEM — I69.328 CVA, OLD, SPEECH/LANGUAGE DEFICIT: Status: ACTIVE | Noted: 2019-08-16

## 2022-12-23 PROBLEM — J90 PLEURAL EFFUSION: Status: ACTIVE | Noted: 2022-01-01

## 2022-12-23 PROBLEM — R35.1 NOCTURIA: Status: ACTIVE | Noted: 2019-08-16

## 2022-12-23 PROBLEM — N40.1 BENIGN LOCALIZED PROSTATIC HYPERPLASIA WITH LOWER URINARY TRACT SYMPTOMS (LUTS): Status: ACTIVE | Noted: 2019-08-16

## 2022-12-23 PROBLEM — I10 HYPERTENSION: Status: ACTIVE | Noted: 2018-04-28

## 2022-12-23 NOTE — LETTER BODY
[FreeTextEntry1] : Philly Luis MD\par [address] [facility] [address]\[place] [address]\par [phone]\par \par Dear Dr. Luis,\par \par REASON FOR VISIT: BPH\par \par This is a 72 year-old gentleman with lower urinary tract symptoms and BPH. The patient has a history of CVA with right sided hemiparesis. He was recently hospitalized for pleural effusion. Since then, he has frequent urination. Patient is here today for evaluation. Patient reports he has weak uroflow, frequency, and hesitancy despite medical therapy. He denies any hematuria or urinary incontinence. His symptoms are aggravated by hydration. He denies any alleviating factors. He is currently taking Flomax QD without improvement. He denies any pain. All other review of systems are negative. He has no cancer in his family medical history. He has no previous surgical history. Past medical history, family history and social history were inquired and were noncontributory to current condition. The patient does not use tobacco or drink alcohol. Medications and allergies were reviewed. He has no known allergies to medication.\par \par On examination, the patient is a healthy-appearing gentleman in no acute distress. He is alert and oriented follows commands. He has normal mood and affect. He is normocephalic. Neck is supple. Oral no thrush. Respirations are unlabored. His abdomen is soft and nontender. Bladder is nonpalpable. No CVA tenderness. Neurologically he is grossly intact. No peripheral edema. Skin without gross abnormality. He has normal male external genitalia. Normal meatus. Bilateral testes are descended intrascrotally and normal to palpation. On rectal examination, there is normal sphincter tone. The prostate is clinically benign without focal induration or nodularity.\par \par Post-void residual on bladder scan today was 20 cc.\par \par ASSESSMENT: BPH\par \par I counseled the patient on the various etiology of his symptoms. I discussed the natural history of BPH and the treatment options available. I discussed the options of conservative management with fluid in dietary restrictions, herbal therapy, medical therapy, and minimally invasive procedures. Risk and benefits were discussed. I answered his questions. I recommended he continue taking Flomax QD. I renewed the patient's prescription for Flomax today. I encouraged the patient to continue medications regularly as directed. I also recommended he start a trial of Trospium. I discussed the potential side effects of the medication. I counseled the patient on its use and side effects. If the patient develops any side effects, the patient will discontinue the medication and contact me. He may also consider cystoscopy to evaluate the urinary outlet. I recommended he obtain PSA, BMP, urinalysis, and urine culture to establish baselines. Risks and alternatives were discussed. I answered the patient questions. The patient will follow-up as directed and will contact me with any questions or concerns. Thank you for the opportunity to participate in the care of Mr. AN. I will keep you updated on  his progress.\par \par Plan: Trial of Trospium. Continue Flomax QD. PSA. BMP. Urinalysis. Urine culture. Follow up in 1 month.

## 2022-12-23 NOTE — HISTORY OF PRESENT ILLNESS
[FreeTextEntry1] : New fail BPH.  Patient on Flomax.  Patient history of CVA with right-sided hemiparesis.  Recently was hospitalized for pleural effusion.  Since that time he has frequent urination.\par \par PVR 20 cc.  Urine culture.  PSA.  Trial of trospium.  Continue Flomax.  Follow-up 1 month.\par \par Please refer to [facility] Consult note

## 2022-12-23 NOTE — ADDENDUM
[FreeTextEntry1] : Entered by SADIE LEMONS, acting as scribe for Dr. Reuben Umana.\par The documentation recorded by the scribe accurately reflects the service I personally performed and the decisions made by me.

## 2023-01-08 PROBLEM — R60.9 EDEMA: Status: ACTIVE | Noted: 2023-01-08

## 2023-01-08 PROBLEM — I48.91 ATRIAL FIBRILLATION: Status: ACTIVE | Noted: 2018-04-28

## 2023-01-08 PROBLEM — I71.9 AORTIC ANEURYSM: Status: ACTIVE | Noted: 2022-01-01

## 2023-01-08 NOTE — HISTORY OF PRESENT ILLNESS
[FreeTextEntry1] : 7/15/22 - Patient reports right chest tenderness for the past year.  Patient reports right ankle swelling.  Patient denies SOB.  Patient denies palpitations.  ECG showed AFIB.  1-2+ right LE edema noted.  BT pending.  I will consider Indapamide if LE edema persists.  He is on Allopurinol for h/o gout.\par \par 1/14/22 - Patient has been stable.  Patient denies CP, SOB, or palpitations.  I advised patient to undergo an echocardiogram.  Patient underwent an echocardiogram and it showed normal LV function, mild dilatation of the ascending aorta (4.3 cm), without significant valvular pathology.  FU 6 months. \par \par 7/14/21 - Patient has been stable.  Patient denies CP, SOB, palpitations, or lightheadedness.  His ECG showed AFIB at rate of 60.  He has trace LE edema. \par \par 1/15/21 - Patient has been stable.   Patient denies CP, SOB, palpitations, or lightheadedness.  He is no longer taking Amlodipine 2.5 mg because his BP has been good.  He is only taking Atorvastatin 10 mg now and his LDL is quite low.  Wife reports that he is eating fatty food because he thinks it is good for him.  \par \par 7/17/20 - Patient reports feeling okay. Patient denies CP, SOB, palpitations, or lightheadedness. He is on Eliquis, his BP is low but that is good for aortic aneurysm.. He walks and his appetite is okay.

## 2023-01-08 NOTE — REASON FOR VISIT
[FreeTextEntry1] : 72-year-old male with AFIB, stroke, aortic aneurysm (4.3 cm),  HTN, presents for followup.  \par \par Patient was last seen on 7/15/22 for followup.  Patient reported right chest tenderness for the past year.  Patient reported right ankle swelling.  1-2+ right LE edema noted.  I would consider Indapamide if LE edema persists.  He was on Allopurinol for h/o gout.\par \par He is on Eliquis 5 mg BID for stroke prevention and  Atenolol 25 mg for rate control.  He is on Simvastatin 10 mg for HLD.  \par \par Patient underwent an echocardiogram and it showed normal LV function, mild dilatation of the ascending aorta (4.3 cm), without significant valvular pathology.\par \par Last echo was on 1/15/21 and it showed normal LV function, marked LAE (5.3 cm), without significant valvular pathology.  Ascending aorta was not well-visualized.\par \par

## 2023-01-08 NOTE — PHYSICAL EXAM
[General Appearance - Well Developed] : well developed [Normal Appearance] : normal appearance [Well Groomed] : well groomed [General Appearance - Well Nourished] : well nourished [No Deformities] : no deformities [General Appearance - In No Acute Distress] : no acute distress [Normal Conjunctiva] : the conjunctiva exhibited no abnormalities [Eyelids - No Xanthelasma] : the eyelids demonstrated no xanthelasmas [Normal Oral Mucosa] : normal oral mucosa [No Oral Pallor] : no oral pallor [No Oral Cyanosis] : no oral cyanosis [Normal Jugular Venous A Waves Present] : normal jugular venous A waves present [Normal Jugular Venous V Waves Present] : normal jugular venous V waves present [No Jugular Venous Barnhart A Waves] : no jugular venous barnhart A waves [Exaggerated Use Of Accessory Muscles For Inspiration] : no accessory muscle use [Respiration, Rhythm And Depth] : normal respiratory rhythm and effort [Auscultation Breath Sounds / Voice Sounds] : lungs were clear to auscultation bilaterally [Heart Sounds] : normal S1 and S2 [Murmurs] : no murmurs present [Arterial Pulses Normal] : the arterial pulses were normal [Edema] : no peripheral edema present [Irregularly Irregular] : the rhythm was irregularly irregular [Abdomen Soft] : soft [Abdomen Tenderness] : non-tender [Abdomen Mass (___ Cm)] : no abdominal mass palpated [FreeTextEntry1] : limited mobility.  [Nail Clubbing] : no clubbing of the fingernails [Cyanosis, Localized] : no localized cyanosis [Petechial Hemorrhages (___cm)] : no petechial hemorrhages [] : no ischemic changes [Oriented To Time, Place, And Person] : oriented to person, place, and time [Affect] : the affect was normal [Mood] : the mood was normal [No Anxiety] : not feeling anxious

## 2023-01-13 ENCOUNTER — APPOINTMENT (OUTPATIENT)
Dept: CARDIOLOGY | Facility: CLINIC | Age: 73
End: 2023-01-13

## 2023-01-13 DIAGNOSIS — I71.9 AORTIC ANEURYSM OF UNSPECIFIED SITE, W/OUT RUPTURE: ICD-10-CM

## 2023-01-13 DIAGNOSIS — R60.9 EDEMA, UNSPECIFIED: ICD-10-CM

## 2023-01-13 DIAGNOSIS — I48.91 UNSPECIFIED ATRIAL FIBRILLATION: ICD-10-CM

## 2023-01-27 ENCOUNTER — APPOINTMENT (OUTPATIENT)
Dept: UROLOGY | Facility: CLINIC | Age: 73
End: 2023-01-27

## 2023-02-17 ENCOUNTER — APPOINTMENT (OUTPATIENT)
Dept: CARDIOLOGY | Facility: CLINIC | Age: 73
End: 2023-02-17